# Patient Record
Sex: MALE | HISPANIC OR LATINO | Employment: FULL TIME | URBAN - METROPOLITAN AREA
[De-identification: names, ages, dates, MRNs, and addresses within clinical notes are randomized per-mention and may not be internally consistent; named-entity substitution may affect disease eponyms.]

---

## 2017-10-04 ENCOUNTER — OFFICE VISIT - HEALTHEAST (OUTPATIENT)
Dept: FAMILY MEDICINE | Facility: CLINIC | Age: 42
End: 2017-10-04

## 2017-10-04 DIAGNOSIS — Z84.89: ICD-10-CM

## 2017-10-04 DIAGNOSIS — Z00.00 HEALTH CARE MAINTENANCE: ICD-10-CM

## 2017-10-04 DIAGNOSIS — R68.82 LOW LIBIDO: ICD-10-CM

## 2017-10-04 DIAGNOSIS — K64.9 HEMORRHOID: ICD-10-CM

## 2017-10-04 DIAGNOSIS — R53.83 FATIGUE: ICD-10-CM

## 2017-10-04 LAB
CHOLEST SERPL-MCNC: 164 MG/DL
FASTING STATUS PATIENT QL REPORTED: YES
HDLC SERPL-MCNC: 33 MG/DL
LDLC SERPL CALC-MCNC: 89 MG/DL
TRIGL SERPL-MCNC: 211 MG/DL

## 2017-10-04 ASSESSMENT — MIFFLIN-ST. JEOR: SCORE: 1893.03

## 2017-10-10 ENCOUNTER — COMMUNICATION - HEALTHEAST (OUTPATIENT)
Dept: FAMILY MEDICINE | Facility: CLINIC | Age: 42
End: 2017-10-10

## 2018-02-06 ENCOUNTER — OFFICE VISIT - HEALTHEAST (OUTPATIENT)
Dept: FAMILY MEDICINE | Facility: CLINIC | Age: 43
End: 2018-02-06

## 2018-02-06 DIAGNOSIS — R31.9 HEMATURIA: ICD-10-CM

## 2018-02-06 DIAGNOSIS — R20.8 RECTAL BURNING: ICD-10-CM

## 2018-02-06 DIAGNOSIS — F43.10 PTSD (POST-TRAUMATIC STRESS DISORDER): ICD-10-CM

## 2018-02-06 LAB
ALBUMIN UR-MCNC: NEGATIVE MG/DL
APPEARANCE UR: CLEAR
BACTERIA #/AREA URNS HPF: ABNORMAL HPF
BILIRUB UR QL STRIP: NEGATIVE
COLOR UR AUTO: YELLOW
GLUCOSE UR STRIP-MCNC: NEGATIVE MG/DL
HGB UR QL STRIP: ABNORMAL
KETONES UR STRIP-MCNC: NEGATIVE MG/DL
LEUKOCYTE ESTERASE UR QL STRIP: NEGATIVE
NITRATE UR QL: NEGATIVE
PH UR STRIP: 7 [PH] (ref 5–8)
RBC #/AREA URNS AUTO: ABNORMAL HPF
SP GR UR STRIP: 1.02 (ref 1–1.03)
SQUAMOUS #/AREA URNS AUTO: ABNORMAL LPF
UROBILINOGEN UR STRIP-ACNC: ABNORMAL
WBC #/AREA URNS AUTO: ABNORMAL HPF

## 2018-02-06 ASSESSMENT — MIFFLIN-ST. JEOR: SCORE: 1906.86

## 2018-02-09 ENCOUNTER — COMMUNICATION - HEALTHEAST (OUTPATIENT)
Dept: FAMILY MEDICINE | Facility: CLINIC | Age: 43
End: 2018-02-09

## 2018-02-09 RX ORDER — TRIAMCINOLONE ACETONIDE 5 MG/G
CREAM TOPICAL 2 TIMES DAILY
Qty: 30 G | Refills: 0 | Status: SHIPPED | OUTPATIENT
Start: 2018-02-09

## 2018-06-26 ENCOUNTER — OFFICE VISIT - HEALTHEAST (OUTPATIENT)
Dept: FAMILY MEDICINE | Facility: CLINIC | Age: 43
End: 2018-06-26

## 2018-06-26 DIAGNOSIS — J20.9 ACUTE BRONCHITIS: ICD-10-CM

## 2018-06-26 LAB — DEPRECATED S PYO AG THROAT QL EIA: NORMAL

## 2018-06-27 LAB — GROUP A STREP BY PCR: NORMAL

## 2018-06-29 ENCOUNTER — COMMUNICATION - HEALTHEAST (OUTPATIENT)
Dept: SCHEDULING | Facility: CLINIC | Age: 43
End: 2018-06-29

## 2018-07-20 ENCOUNTER — OFFICE VISIT - HEALTHEAST (OUTPATIENT)
Dept: FAMILY MEDICINE | Facility: CLINIC | Age: 43
End: 2018-07-20

## 2018-07-20 ENCOUNTER — HOSPITAL ENCOUNTER (OUTPATIENT)
Dept: ULTRASOUND IMAGING | Facility: HOSPITAL | Age: 43
Discharge: HOME | End: 2018-07-20
Attending: FAMILY MEDICINE

## 2018-07-20 DIAGNOSIS — K80.20 GALLSTONES: ICD-10-CM

## 2018-07-20 DIAGNOSIS — K21.9 GERD (GASTROESOPHAGEAL REFLUX DISEASE): ICD-10-CM

## 2018-07-20 ASSESSMENT — MIFFLIN-ST. JEOR: SCORE: 1922.51

## 2018-07-25 ENCOUNTER — OFFICE VISIT - HEALTHEAST (OUTPATIENT)
Dept: FAMILY MEDICINE | Facility: CLINIC | Age: 43
End: 2018-07-25

## 2018-07-25 DIAGNOSIS — K21.9 GASTROESOPHAGEAL REFLUX DISEASE WITHOUT ESOPHAGITIS: ICD-10-CM

## 2018-07-25 DIAGNOSIS — K64.4 EXTERNAL HEMORRHOIDS: ICD-10-CM

## 2018-07-25 ASSESSMENT — MIFFLIN-ST. JEOR: SCORE: 1910.26

## 2018-08-08 ENCOUNTER — COMMUNICATION - HEALTHEAST (OUTPATIENT)
Dept: FAMILY MEDICINE | Facility: CLINIC | Age: 43
End: 2018-08-08

## 2019-01-10 ENCOUNTER — OFFICE VISIT - HEALTHEAST (OUTPATIENT)
Dept: FAMILY MEDICINE | Facility: CLINIC | Age: 44
End: 2019-01-10

## 2019-01-10 DIAGNOSIS — H61.21 IMPACTED CERUMEN OF RIGHT EAR: ICD-10-CM

## 2019-01-10 DIAGNOSIS — H66.003 ACUTE SUPPURATIVE OTITIS MEDIA OF BOTH EARS WITHOUT SPONTANEOUS RUPTURE OF TYMPANIC MEMBRANES, RECURRENCE NOT SPECIFIED: ICD-10-CM

## 2019-01-10 DIAGNOSIS — J01.40 ACUTE NON-RECURRENT PANSINUSITIS: ICD-10-CM

## 2019-01-10 LAB
DEPRECATED S PYO AG THROAT QL EIA: NORMAL
FLUAV AG SPEC QL IA: NORMAL
FLUBV AG SPEC QL IA: NORMAL

## 2019-01-11 LAB — GROUP A STREP BY PCR: NORMAL

## 2021-05-31 VITALS — HEIGHT: 70 IN | WEIGHT: 222.8 LBS | BODY MASS INDEX: 31.9 KG/M2

## 2021-05-31 VITALS — WEIGHT: 218 LBS | BODY MASS INDEX: 30.52 KG/M2 | HEIGHT: 71 IN

## 2021-06-01 VITALS — BODY MASS INDEX: 29.57 KG/M2 | HEIGHT: 72 IN | WEIGHT: 218.3 LBS

## 2021-06-01 VITALS — BODY MASS INDEX: 29.93 KG/M2 | HEIGHT: 72 IN | WEIGHT: 221 LBS

## 2021-06-01 VITALS — BODY MASS INDEX: 31.28 KG/M2 | WEIGHT: 218 LBS

## 2021-06-02 VITALS — WEIGHT: 217 LBS | BODY MASS INDEX: 29.84 KG/M2

## 2021-06-05 ENCOUNTER — RECORDS - HEALTHEAST (OUTPATIENT)
Dept: FAMILY MEDICINE | Facility: CLINIC | Age: 46
End: 2021-06-05

## 2021-06-05 DIAGNOSIS — N45.3 ORCHITIS AND EPIDIDYMITIS: ICD-10-CM

## 2021-06-13 NOTE — PROGRESS NOTES
Assessment/Plan:     1. Health care maintenance  Age appropriate health care screening and guidance discussed including nutrition, exercise, immunization updates and vitamin supplementation.   Screening labs and exams ordered below.   - Glucose; Future  - Lipid Cascade FASTING  - Glucose    2. Fatigue  Labs as below declines sleep study.  - Thyroid Cascade  - Hemoglobin    3. Low libido  Ordered labs as below further treatment based on results  - Testosterone, Total and Bioavailable    4. Hemorrhoid  Suspect hemorrhoid due to patient's symptoms.  Recommend that he get Metamucil to help keep stool soft and avoid constipation stay well-hydrated.  Recommend he can use preparation H or similar.  Did discuss if symptoms worsen or do not improve we need to consider further workup he will let us know if symptoms worsen or do not improve.    5. Family hx neoplasm uncertain behavior liver and biliary passages  We will continue to monitor as below  - Hepatic Profile      Total time spent was 45 minutes, >50% spent discussing treatment options noted above, counseling and coordination of care.  Additional time needed to review and establish care and for help with .      Subjective:      Tutu Moon is a 42 y.o. male comes in today today for establishing care and for annual exam.  He plans to switch care to his office his wife comes here and is a patient of mine.  He feels well overall.  Fasting for labs today has several concerns.  He has some low libido and wants to get his testosterone checked.  Does not have significant erectile dysfunction states sometime energy level is low but low.  States he is sleeping okay but he does snore at night does not believe he has apnea.  States last 2 days he has been tired more than normal.  Has family history of liver cancer likes to get hepatic profile checked recently he has had imaging that has been stable in the past no new concerns with the liver.  Also states that he  has had some bleeding from the rectum intermittently after a bowel movement when he wipes he states he notices this more so when the bowel movement is more firm and constipated.  States some burning in the rectal area.  He states that it has happened twice in the last 2 months.  But does not seem to be mixed in with the stool is not very small amount when he wipes.  He has acid reflux feels it is under fair control.  Reviewed past medical surgical social family history up to the chart as necessary    No current outpatient prescriptions on file.     No current facility-administered medications for this visit.      No Known Allergies  Past Medical History, Family History, and Social History reviewed.  Past Medical History:   Diagnosis Date     GERD (gastroesophageal reflux disease)      Multiple gastric ulcers      Past Surgical History:   Procedure Laterality Date     [place] EDG US EXAM SURGICAL ALTER STOM DUODENUM/JEJUNUM N/A 7/6/2015    Procedure: ENDOSCOPIC ULTRASOUND;  Surgeon: Mark Casillas MD;  Location: [facility];  Service: Gastroenterology     Review of patient's allergies indicates no known allergies.  Family History   Problem Relation Age of Onset     Hypertension Mother      Depression Mother      Liver cancer Father      No Medical Problems Sister      No Medical Problems Brother      No Medical Problems Maternal Grandmother      No Medical Problems Maternal Grandfather      No Medical Problems Paternal Grandmother      No Medical Problems Paternal Grandfather      Social History     Social History     Marital status:      Spouse name: N/A     Number of children: N/A     Years of education: N/A     Occupational History     Not on file.     Social History Main Topics     Smoking status: Never Smoker     Smokeless tobacco: Never Used     Alcohol use 0.6 oz/week     1 Cans of beer per week     Drug use: No     Sexual activity: Not on file     Other Topics Concern     Not on file     Social History  "Narrative         Review of systems is as stated in HPI, and the remainder of the 10 system review is otherwise negative.    Objective:     Vitals:    10/04/17 0824   BP: 110/80   Pulse: 66   SpO2: 98%   Weight: 218 lb (98.9 kg)   Height: 5' 10.5\" (1.791 m)    Body mass index is 30.84 kg/(m^2).  Wt Readings from Last 3 Encounters:   10/04/17 218 lb (98.9 kg)   09/13/16 218 lb 11.2 oz (99.2 kg)   08/01/16 220 lb 6.4 oz (100 kg)       General Appearance:    Alert, cooperative, no distress, appears stated age    Head:    Normocephalic, without obvious abnormality, atraumatic   Eyes:    PERRL, EOM's intact, no conjunctivitis    Ears:    Normal TM's and external ear canals   Nose:   Mucosa normal, no drainage     or sinus tenderness   Throat:   Oropharynx is clear   Neck:   Supple, symmetrical, no adenopathy, no thyromegally, no carotid bruit        Lungs:     Clear to auscultation bilaterally, respirations unlabored   Chest Wall:    No tenderness or deformity    Heart:    Regular rate and rhythm, S1 and S2 normal, no murmur, rub    or gallop       Abdomen:     Soft, non-tender, bowel sounds active all four quadrants,     no masses, no organomegaly, rectal exam is consistent with nonthrombosed hemorrhoid.  No significant fissures or other concerns           Extremities:   Extremities normal, atraumatic, no cyanosis or edema   Pulses:   2+ and symmetric all extremities   Neuro:   cranial nerves grossly intact, grossly normal sensation and reflexes in extremities    Psych:   grossly normal mood and affect without acute anxiety or psychosis    Skin:  Skin tags, otherwise no rashes or lesions   :          This note has been dictated using voice recognition software. Any grammatical or context distortions are unintentional and inherent to the software.   "
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
## 2021-06-16 PROBLEM — Z84.89: Status: ACTIVE | Noted: 2017-10-04

## 2021-06-17 NOTE — PATIENT INSTRUCTIONS - HE
Patient Instructions by Fish Acevedo MD at 1/10/2019  1:10 PM     Author: Fish Acevedo MD Service: -- Author Type: Physician    Filed: 1/10/2019  2:18 PM Encounter Date: 1/10/2019 Status: Addendum    : Fish Acevedo MD (Physician)    Related Notes: Original Note by Fish Acevedo MD (Physician) filed at 1/10/2019  2:18 PM       - Take the full course of Augmentin as prescribed.   - Take a probiotic while taking this antibiotic. This can be purchased over the counter at your local pharmacy.   - Continue taking ibuprofen and / or acetaminophen as needed for fever and discomfort.       Patient Education     Sinusitis (Antibiotic Treatment)    The sinuses are air-filled spaces within the bones of the face. They connect to the inside of the nose. Sinusitis is an inflammation of the tissue that lines the sinuses. Sinusitis can occur during a cold. It can also happen due to allergies to pollens and other particles in the air. Sinusitis can cause symptoms of sinus congestion and a feeling of fullness. A sinus infection causes fever, headache, and facial pain. There is often green or yellow fluid draining from the nose or into the back of the throat (post-nasal drip). You have been given antibiotics to treat this condition.  Home care    Take the full course of antibiotics as instructed. Do not stop taking them, even when you feel better.    Drink plenty of water, hot tea, and other liquids. This may help thin nasal mucus. It also may help your sinuses drain fluids.    Heat may help soothe painful areas of your face. Use a towel soaked in hot water. Or,  the shower and direct the warm spray onto your face. Using a vaporizer along with a menthol rub at night may also help soothe symptoms.     An expectorant with guaifenesin may help thin nasal mucus and help your sinuses drain fluids.    You can use an over-the-counter decongestant, unless a similar medicine was prescribed to you. Nasal  sprays work the fastest. Use one that contains phenylephrine or oxymetazoline. First blow your nose gently. Then use the spray. Do not use these medicines more often than directed on the label. If you do, your symptoms may get worse. You may also take pills that contain pseudoephedrine. Dont use products that combine multiple medicines. This is because side effects may be increased. Read labels. You can also ask the pharmacist for help. (People with high blood pressure should not use decongestants. They can raise blood pressure.)    Over-the-counter antihistamines may help if allergies contributed to your sinusitis.      Do not use nasal rinses or irrigation during an acute sinus infection, unless your healthcare provider tells you to. Rinsing may spread the infection to other areas in your sinuses.    Use acetaminophen or ibuprofen to control pain, unless another pain medicine was prescribed to you. If you have chronic liver or kidney disease or ever had a stomach ulcer, talk with your healthcare provider before using these medicines. (Aspirin should never be taken by anyone under age 18 who is ill with a fever. It may cause severe liver damage.)    Don't smoke. This can make symptoms worse.  Follow-up care  Follow up with your healthcare provider or our staff if you are better in 1 week.  When to seek medical advice  Call your healthcare provider if any of these occur:    Facial pain or headache that gets worse    Stiff neck    Unusual drowsiness or confusion    Swelling of your forehead or eyelids    Vision problems, such as blurred or double vision    Fever of 100.4 F (38 C) or higher, or as directed by your healthcare provider    Seizure    Breathing problems    Symptoms don't go away in 10 days  Prevention  Here are steps you can take to help prevent an infection:    Keep good hand washing habits.    Dont have close contact with people who have sore throats, colds, or other upper respiratory  infections.    Dont smoke, and stay away from secondhand smoke.    Stay up to date with of your vaccines.  Date Last Reviewed: 11/1/2017 2000-2017 [employer]. [address] [place] [address], [place] [address]. All rights reserved. This information is not intended as a substitute for professional medical care. Always follow your healthcare professional's instructions.           Patient Education     Middle Ear Infection (Adult)  You have an infection of the middle ear, the space behind the eardrum. This is also called acute otitis media (AOM). Sometimes it is caused by the common cold. This is because congestion can block the internal passage (eustachian tube) that drains fluid from the middle ear. When the middle ear fills with fluid, bacteria can grow there and cause an infection. Oral antibiotics are used to treat this illness, not ear drops. Symptoms usually start to improve within 1 to 2 days of treatment.    Home care  The following are general care guidelines:    Finish all of the antibiotic medicine given, even though you may feel better after the first few days.    You may use over-the-counter medicine, such as acetaminophen or ibuprofen, to control pain and fever, unless something else was prescribed. If you have chronic liver or kidney disease or have ever had a stomach ulcer or gastrointestinal bleeding, talk with your healthcare provider before using these medicines. Do not give aspirin to anyone under 18 years of age who has a fever. It may cause severe illness or death.  Follow-up care  Follow up with your healthcare provider, or as advised, in 2 weeks if all symptoms have not gotten better, or if hearing doesn't go back to normal within 1 month.  When to seek medical advice  Call your healthcare provider right away if any of these occur:    Ear pain gets worse or does not improve after 3 days of treatment    Unusual drowsiness or confusion    Neck pain, stiff neck, or headache    Fluid or  blood draining from the ear canal    Fever of 100.4 F (38 C) or as advised     Seizure  Date Last Reviewed: 6/1/2016 2000-2017 [employer]. [address] [place] [address], [place] [address]. All rights reserved. This information is not intended as a substitute for professional medical care. Always follow your healthcare professional's instructions.

## 2021-06-18 NOTE — PROGRESS NOTES
Assessment:        Acute bronchitis       Plan:       Antitussives per orders  OTC decongestants discussed  Trial of nasal steroids  Patient denied albuterol inhaler at this time  Provided work note  Discussed signs of worsening symptoms and when to follow-up with PCP if no symptom improvement.       Patient Instructions   You were seen today for acute bronchitis. This is likely due to a viral illness.    Symptom management:  - Get plenty of rest  - Avoid smoking and second hand smoke  - May take tylenol or ibuprofen for fever/discomfort  - Drink plenty of non-caffeinated fluids  - Use nasal steroid spray (Flonase) and/or sudafed for sinus congestion  - May use tessalon perles to help suppress the cough      Reasons to be seen in the emergency room:  - Develop a fever of 100.4 or higher  - Cough changes, coughing up blood, or become short of breath  - Neck stiffness  - Chest pain  - Severe headache  - Unable to tolerate eating or drinking fluids    Otherwise, if no symptom improvement after 5 days, follow-up with your primary care provider.      Subjective:        Tutu Moon is a 43 y.o. male here for evaluation of a cough.  The cough is non-productive with chest pain during cough; no dyspnea, wheezing, or hemoptysis. Onset of symptoms was 1 week ago, unchanged since that time.  Associated symptoms include subjective fever/chills, sore throat, rhinorrhea, sinus congestion, and body aches. Patient does not have a history of asthma. Patient has not had recent travel. Patient does not have a history of smoking. Treatment has included robitussin with good relief.    The following portions of the patient's history were reviewed and updated as appropriate: allergies, current medications and problem list.    Review of Systems  Pertinent items are noted in HPI.     Allergies  No Known Allergies       Objective:       /64  Pulse 64  Temp 98  F (36.7  C) (Oral)   Resp 18  Wt 218 lb (98.9 kg)  SpO2 98%  BMI  31.28 kg/m2  General appearance: alert, appears stated age, cooperative, no distress and non-toxic  Head: Normocephalic, without obvious abnormality, atraumatic, sinuses nontender to percussion  Ears: TM's intact with mild mucoid fluid, no erythema or bulging; external ears normal  Nose: no discharge  Throat: no tonsil swelling or exudate; posterior oropharynx with mild erythema; MMM, lips and tongue normal  Neck: mild anterior cervical adenopathy and supple, symmetrical, trachea midline  Lungs: clear to auscultation bilaterally and no rhonchi, rales, or wheezing  Heart: regular rate and rhythm, S1, S2 normal, no murmur, click, rub or gallop     Lab Results    Recent Results (from the past 24 hour(s))   Rapid Strep A Screen-Throat   Result Value Ref Range    Rapid Strep A Antigen No Group A Strep detected, presumptive negative No Group A Strep detected, presumptive negative     I personally reviewed these results and discussed findings with the patient.

## 2021-06-19 NOTE — PROGRESS NOTES
Assessment:     1. GERD (gastroesophageal reflux disease)  US Abdomen Complete    Hepatic Profile   2. Gallstones  US Abdomen Complete    Hepatic Profile       Plan:     1. GERD (gastroesophageal reflux disease)  Patient will be started on Prilosec 20 mg in the morning he is to take 1 dosage immediately upon getting the prescription; I will see the patient for follow-up early next week  - US Abdomen Complete; Future  - Hepatic Profile    2. Gallstones  We are trying to schedule the ultrasound for today; one year ago CT scan revealed faint gallstones patient is placed on a low-fat diet  - US Abdomen Complete; Future  - Hepatic Profile      Subjective:   I am seeing the patient for the first time.  Patient's had right upper quadrant midepigastric discomfort with some reflux for 30 days now patient notices that it gets worse with a fatty meal or after ingestion of moderate amount of alcohol.  Patient drinks 1-3 beers per month whenever he does this seems to exacerbate his symptoms.  He has tried various home remedies including olive oil and lemon concoctions without relief.  Patient takes daily garlic tablet.  Patient does eat large meals 3 times daily works in a [employer] business heavy work.  Weight has been stable no hemoptysis no hematemesis.  History of hemorrhoids.  Patient denies any visual complaints cranial nerve dysfunction shortness of breath dyspnea no urinary tract symptoms.  Medical decision making today we are going to do an ultrasound today to see the status of his gallbladder as I suspect sludge versus stones.  We will do hepatic profile also.  There was no evidence of icterus on examination.  Patient will be on low-fat diet I will begin Prilosec because of past medical history of H pylori.  He is to take this upon arising in the morning.  All this was conducted between myself and  and the patient.  This was a 30 minute exam 50% of the time was spent explaining the  "pathophysiology of stone versus sludge versus ulcer disease and explaining H pylori to the patient with the  present.  I will see the patient for follow-up within the next 72 hours    Review of Systems: A complete 14 point review of systems was obtained and is negative or as stated in the history of present illness.    Past Medical History:   Diagnosis Date     GERD (gastroesophageal reflux disease)      Multiple gastric ulcers      Family History   Problem Relation Age of Onset     Hypertension Mother      Depression Mother      Liver cancer Father      No Medical Problems Sister      No Medical Problems Brother      No Medical Problems Maternal Grandmother      No Medical Problems Maternal Grandfather      No Medical Problems Paternal Grandmother      No Medical Problems Paternal Grandfather      Past Surgical History:   Procedure Laterality Date     [place] EDG US EXAM SURGICAL ALTER STOM DUODENUM/JEJUNUM N/A 7/6/2015    Procedure: ENDOSCOPIC ULTRASOUND;  Surgeon: Mark Casillas MD;  Location: [facility];  Service: Gastroenterology     Social History   Substance Use Topics     Smoking status: Never Smoker     Smokeless tobacco: Never Used     Alcohol use 0.6 oz/week     1 Cans of beer per week         Objective:   /70  Pulse 64  Ht 5' 11.5\" (1.816 m)  Wt 221 lb (100.2 kg)  BMI 30.39 kg/m2    General Appearance:  Alert, cooperative, no distress  Head:  Normocephalic, no obvious abnormality  Ears: TM anatomy normal  Eyes:  PERRL, EOM's intact, conjunctiva and corneas clear  Nose:  Nares symmetrical, septum midline, mucosa pink, no sinus tenderness  Throat:  Lips, tongue, and mucosa are moist, pink, and intact  Neck:  Supple, symmetrical, trachea midline, no adenopathy; thyroid: no enlargement, symmetric,no tenderness/mass/nodules; no carotid bruit, no JVD  Back:  Symmetrical, no curvature, ROM normal, no CVA tenderness  Chest/Breast:  No mass or tenderness  Lungs:  Clear to auscultation " bilaterally, respirations unlabored   Heart:  Normal PMI, regular rate & rhythm, S1 and S2 normal, no murmurs, rubs, or gallops  Abdomen:  Soft, non-tender, patient is experiencing some subxiphoid tenderness there is no tenderness in the right upper quadrant; no guarding  Musculoskeletal:  Tone and strength strong and symmetrical, all extremities  Lymphatic:  No adenopathy  Skin/Hair/Nails:  Skin warm, dry, and intact, no rashes  Neurologic:  Alert and oriented x3, no cranial nerve deficits, normal strength and tone, gait steady  Extremities:  No edema.  Andreea's sign negative.    Genitourinary: deferred  Pulses:  Equal bilaterally     The following high BMI interventions were performed this visit: weight monitoring      This note has been dictated using voice recognition software. Any grammatical or context distortions are unintentional and inherent to the the software.

## 2021-06-19 NOTE — PROGRESS NOTES
Assessment:     1. Gastroesophageal reflux disease without esophagitis  pramoxine-hydrocortisone (ANALPRAM-HC) 1-1 % rectal cream   2. External hemorrhoids         Plan:     1. Gastroesophageal reflux disease without esophagitis  Patient will continue to take his Prilosec first thing in the morning for at least 90 days; because of fatty liver findings we will discontinue his alcohol for 90 days; through the  patient says this is no problem  - pramoxine-hydrocortisone (ANALPRAM-HC) 1-1 % rectal cream; Insert into the rectum 2 (two) times a day.  Dispense: 28.4 g; Refill: 3    2. External hemorrhoids  Use topical as outlined above    3.  Fatty liver- patient will discontinue all alcohol for 90 days we will repeat his liver function tests at that time  Subjective:   Patient is being seen for follow-up of his ultrasound results due to abdominal pain and bloating of 1 week ago.  Ultrasound shows a gallbladder polyp which is benign patient has no choleliths or calculi.  He was informed through the .  Patient does have a fatty liver.  He drinks 10-15's beers per month.  Obviously this is not contributing to health of his hepatic function so we will discontinue alcohol for 90 days at which time we will repeat his LFTs.  Patient says this is no problem.  I want to continue on his Prilosec because he did get symptom relief from this treatment 1 in the morning for 90 days patient is also being seen for new problem today which are external hemorrhoids he does a lot of sitting and lifting at his job this probably brought on the condition.  I will treat him with some Analpram cream applying 3 times daily during an outbreak.  All medical questions that were asked were answered through the  and patient understands I will see the patient for follow-up 90 days.20 minute visit which 50% of the time was spent in counseling the patient with regards to the importance of no alcohol intake.    Review of  "Systems: A complete 14 point review of systems was obtained and is negative or as stated in the history of present illness.    Past Medical History:   Diagnosis Date     GERD (gastroesophageal reflux disease)      Multiple gastric ulcers      Family History   Problem Relation Age of Onset     Hypertension Mother      Depression Mother      Liver cancer Father      No Medical Problems Sister      No Medical Problems Brother      No Medical Problems Maternal Grandmother      No Medical Problems Maternal Grandfather      No Medical Problems Paternal Grandmother      No Medical Problems Paternal Grandfather      Past Surgical History:   Procedure Laterality Date     [place] EDG US EXAM SURGICAL ALTER STOM DUODENUM/JEJUNUM N/A 7/6/2015    Procedure: ENDOSCOPIC ULTRASOUND;  Surgeon: Mark Casillas MD;  Location: [facility];  Service: Gastroenterology     Social History   Substance Use Topics     Smoking status: Never Smoker     Smokeless tobacco: Never Used     Alcohol use 0.6 oz/week     1 Cans of beer per week         Objective:   /64  Pulse 70  Ht 5' 11.5\" (1.816 m)  Wt 218 lb 4.8 oz (99 kg)  BMI 30.02 kg/m2    General Appearance:  Alert, cooperative, no distress  Head:  Normocephalic, no obvious abnormality  Ears: TM anatomy normal  Eyes:  PERRL, EOM's intact, conjunctiva and corneas clear  Nose:  Nares symmetrical, septum midline, mucosa pink, no sinus tenderness  Throat:  Lips, tongue, and mucosa are moist, pink, and intact  Neck:  Supple, symmetrical, trachea midline, no adenopathy; thyroid: no enlargement, symmetric,no tenderness/mass/nodules; no carotid bruit, no JVD  Back:  Symmetrical, no curvature, ROM normal, no CVA tenderness  Chest/Breast:  No mass or tenderness  Lungs:  Clear to auscultation bilaterally, respirations unlabored   Heart:  Normal PMI, regular rate & rhythm, S1 and S2 normal, no murmurs, rubs, or gallops  Abdomen:  Soft, non-tender, bowel sounds active all four quadrants, no mass, " or organomegaly; patient has external hemorrhoids we will treat him with a topical  Musculoskeletal:  Tone and strength strong and symmetrical, all extremities  Lymphatic:  No adenopathy  Skin/Hair/Nails:  Skin warm, dry, and intact, no rashes  Neurologic:  Alert and oriented x3, no cranial nerve deficits, normal strength and tone, gait steady  Extremities:  No edema.  Andreea's sign negative.    Genitourinary: deferred  Pulses:  Equal bilaterally           This note has been dictated using voice recognition software. Any grammatical or context distortions are unintentional and inherent to the the software.

## 2021-06-23 NOTE — PROGRESS NOTES
Subjective:   Tutu Moon is a(n) 43 y.o. Other male who presents to [facility] with the following complaint(s):  Fever; Generalized Body Aches (x1 week); Nasal Congestion; Chest Pain (x4 days); and Back Pain (x4 days)     History of Present Illness:  Primary symptom: Flu / Cold / Cough  Onset: 7 day(s) ago  Progression: Worsening  Fevers: Yes, has not checked his temperature  Chills: Yes  Sore throat: Yes  Nasal congestion: Yes  Rhinorrhea: Yes, clear  Sinus pain / pressure: Yes, frontal  Ear pain: Yes, left greater than right  Headache: Yes  Body aches: Yes  Cough: Has minimal coughing  Shortness of breath: Yes, mildly  Sputum production: Minimal, yellow / green  Rash: No  GI symptoms: Mild nausea  Additional symptoms: Chest and back hurt with coughing  Home therapies utilized: Theraflu, Nyquil, and ibuprofen.   History of asthma: No  History of COPD: No  History of pneumonia: No  Exposure to influenza: No  Exposure to strep: No  Other ill contacts: No  Recent travel: No  Tobacco use / exposure: No    The following portions of the patient's history were reviewed and updated as appropriate: allergies, current medications, past family history, past medical history, past social history, past surgical history and problem list.    Review of Systems:   Review of Systems   All other systems reviewed and are negative.    Objective:     Vitals:    01/10/19 1322   BP: 132/70   Patient Site: Right Arm   Patient Position: Sitting   Cuff Size: Adult Large   Pulse: 86   Resp: 16   Temp: (!) 100.8  F (38.2  C)   TempSrc: Oral   SpO2: 98%   Weight: 217 lb (98.4 kg)     Physical Exam   Constitutional: He is oriented to person, place, and time. He appears well-developed and well-nourished.  Non-toxic appearance. No distress.   HENT:   Head: Normocephalic and atraumatic.   Right Ear: Tympanic membrane is erythematous. A middle ear effusion is present.   Left Ear: Tympanic membrane is erythematous and bulging.   Nose: Mucosal  edema present. No rhinorrhea.   Mouth/Throat: Uvula is midline and mucous membranes are normal. No oral lesions. Posterior oropharyngeal erythema present. No oropharyngeal exudate.   Right ear canal initially occluded with a cerumen plug. Repeat exam performed after lavage was completed by the LPN, with abnormalities as listed above.    Eyes: Conjunctivae and lids are normal.   Neck: Neck supple. No edema and no erythema present.   Cardiovascular: Normal rate, regular rhythm, S1 normal and S2 normal. Exam reveals no gallop and no friction rub.   No murmur heard.  Pulmonary/Chest: Effort normal and breath sounds normal. No stridor. He has no wheezes. He has no rhonchi. He has no rales.   Lymphadenopathy:     He has no cervical adenopathy.   Neurological: He is alert and oriented to person, place, and time. No cranial nerve deficit.   Skin: Skin is warm and dry. No rash noted. No pallor.   Nursing note and vitals reviewed.    Laboratory:  Results for orders placed or performed in visit on 01/10/19   Rapid Strep A Screen-Throat   Result Value Ref Range    Rapid Strep A Antigen No Group A Strep detected, presumptive negative No Group A Strep detected, presumptive negative   Influenza A/B Rapid Test- Nasal Swab   Result Value Ref Range    Influenza  A, Rapid Antigen No Influenza A antigen detected No Influenza A antigen detected    Influenza B, Rapid Antigen No Influenza B antigen detected No Influenza B antigen detected       Radiology:  N/A    Electrocardiogram:  N/A    Assessment/Plan   1. Acute non-recurrent pansinusitis  - Rapid Strep A Screen-Throat  - Influenza A/B Rapid Test- Nasal Swab  - Group A Strep, RNA Direct Detection, Throat  - amoxicillin-clavulanate (AUGMENTIN) 875-125 mg per tablet; Take 1 tablet by mouth 2 (two) times a day for 10 days.  Dispense: 20 tablet; Refill: 0    2. Acute suppurative otitis media of both ears without spontaneous rupture of tympanic membranes, recurrence not specified  - Rapid  Strep A Screen-Throat  - Influenza A/B Rapid Test- Nasal Swab  - Group A Strep, RNA Direct Detection, Throat  - amoxicillin-clavulanate (AUGMENTIN) 875-125 mg per tablet; Take 1 tablet by mouth 2 (two) times a day for 10 days.  Dispense: 20 tablet; Refill: 0    3. Impacted cerumen of right ear  - Nursing communication    - Reviewed negative strep and influenza test results with the patient and his wife.   - Treating sinusitis and otitis media with Augmentin as listed above. Discussed symptomatic / supportive cares.   - Counseled patient regarding assessment and plan for evaluation and treatment. Questions were answered. See AVS for the specific written instructions and educational handout(s) regarding sinusitis and otitis media that were provided at the conclusion of the visit.   - Discussed signs / symptoms that warrant urgent / emergent medical attention.   - Follow up as needed.     Due to language barrier, a(n) phone  was utilized for the duration of this patient's encounter.     Fish Acevedo MD

## 2023-06-06 NOTE — LETTER
Letter by Fish Acevedo MD at      Author: Fish Acevedo MD Service: -- Author Type: --    Filed:  Encounter Date: 1/10/2019 Status: (Other)       January 10, 2019     Patient: Tutu Moon   YOB: 1975   Date of Visit: 1/10/2019       To Whom it May Concern:    Tutu Moon was seen in my clinic on 1/10/2019. Please excuse his absence from work today (1/10/2019) and tomorrow (1/11/2019) due to illness.     If you have any questions or concerns, please don't hesitate to call.    Sincerely,         Electronically signed by Fish Acevedo MD        not applicable (Male)